# Patient Record
Sex: FEMALE | Race: NATIVE HAWAIIAN OR OTHER PACIFIC ISLANDER | NOT HISPANIC OR LATINO
[De-identification: names, ages, dates, MRNs, and addresses within clinical notes are randomized per-mention and may not be internally consistent; named-entity substitution may affect disease eponyms.]

---

## 2023-06-26 ENCOUNTER — APPOINTMENT (OUTPATIENT)
Dept: ORTHOPEDIC SURGERY | Facility: CLINIC | Age: 38
End: 2023-06-26
Payer: COMMERCIAL

## 2023-06-26 DIAGNOSIS — S82.002A UNSPECIFIED FRACTURE OF LEFT PATELLA, INITIAL ENCOUNTER FOR CLOSED FRACTURE: ICD-10-CM

## 2023-06-26 DIAGNOSIS — M25.562 PAIN IN LEFT KNEE: ICD-10-CM

## 2023-06-26 PROBLEM — Z00.00 ENCOUNTER FOR PREVENTIVE HEALTH EXAMINATION: Status: ACTIVE | Noted: 2023-06-26

## 2023-06-26 PROCEDURE — 73562 X-RAY EXAM OF KNEE 3: CPT | Mod: LT

## 2023-06-26 RX ORDER — IBUPROFEN 600 MG/1
600 TABLET, FILM COATED ORAL TWICE DAILY
Qty: 60 | Refills: 0 | Status: ACTIVE | COMMUNITY
Start: 2023-06-26 | End: 1900-01-01

## 2023-06-26 NOTE — PHYSICAL EXAM
[de-identified] : Patient is WDWN, alert, and in no acute distress. Breathing is unlabored. She is grossly oriented to person, place, and time.\par \par She is accompanied by her father today.\par \par Left Knee:\par She presents to the office with a knee immobilizer and bulky Diallo dressing in place, which were removed for physical exam.\par NWB with the assistance of crutches.\par There is an abrasion noted anterolaterally overlying the patella. \par Moderate edema noted diffusely to the knee with mild ecchymosis. \par She cannot actively flex and extend the knee due to injury and pain, however on exam she can maintain extension of the knee against gravity indicative of her extensor mechanism to be intact.  [de-identified] : AP, lateral and oblique views of the LEFT knee were obtained today and revealed a longitudinal fracture of the patella with displacement. The patellofemoral joint appears to be well aligned.

## 2023-06-26 NOTE — END OF VISIT
[FreeTextEntry3] : All medical record entries made by the Scribe were at my,  Dr. Lukas Wright MD., direction and personally dictated by me on 06/26/2023. I have personally reviewed the chart and agree that the record accurately reflects my personal performance of the history, physical exam, assessment and plan.

## 2023-06-26 NOTE — ADDENDUM
[FreeTextEntry1] : I, Manju Hayes wrote this note acting as a scribe for Dr. Lukas Wright on Jun 26, 2023.

## 2023-06-26 NOTE — DISCUSSION/SUMMARY
[de-identified] : The underlying pathophysiology was reviewed with the patient. XR films were reviewed with the patient. Discussed at length the nature of the patient’s condition. The left knee symptoms are secondary to a longitudinal patella fracture. \par \par At this time, I reviewed her xrays with her and her father in great detail. I explained to them that based upon my review of today's xrays, although there is evidence of mild extra articular displacement, the patello  joint surface is well aligned, and I would recommend nonoperative management. She was instructed to remain immobilized in the knee immobilizer brace for 6 weeks. I did tell her that if she were to fall on a bent knee without the knee immobilizer brace in place, she is at risk for displacing the fracture and subsequently requiring ORIF. She may WBAT with either the use of a cane or walker, again with the knee immobilizer brace in place to maintain extension and for support/protection. \par \par RX: Ibuprofen 600mg, BID (60 tablets).\par RX: Wheelchair.\par \par All questions answered, understanding verbalized. Patient in agreement with plan of care. Follow up in 1 week for repeat xrays.
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
## 2023-07-06 ENCOUNTER — APPOINTMENT (OUTPATIENT)
Dept: ORTHOPEDIC SURGERY | Facility: CLINIC | Age: 38
End: 2023-07-06
Payer: COMMERCIAL

## 2023-07-06 PROCEDURE — 99203 OFFICE O/P NEW LOW 30 MIN: CPT

## 2023-07-06 PROCEDURE — 73564 X-RAY EXAM KNEE 4 OR MORE: CPT | Mod: LT

## 2023-07-06 NOTE — REASON FOR VISIT
[Follow-Up Visit] : a follow-up visit for [Family Member] : family member [FreeTextEntry2] : Left knee

## 2023-07-06 NOTE — PHYSICAL EXAM
Please call patient .  Thanks [de-identified] : Patient is WDWN, alert, and in no acute distress. Breathing is unlabored. She is grossly oriented to person, place, and time.\par \par She is accompanied by her father today.\par \par Left Knee:\par She presents to the office with a knee immobilizer, which was removed for exam.\par WBAT with the assistance of crutches.\par There is a healing abrasion noted anterolaterally overlying the patella. \par Moderate edema noted diffusely to the knee with mild ecchymosis. \par She cannot actively flex and extend the knee due to injury and pain, however on exam she can maintain extension of the knee against gravity indicative of her extensor mechanism to be intact.  [de-identified] : AP, lateral and oblique views of the LEFT knee were obtained today and revealed a longitudinal fracture of the patella with displacement. The patellofemoral joint appears to be well aligned.

## 2023-07-06 NOTE — ADDENDUM
[FreeTextEntry1] : I, Manju Hayes wrote this note acting as a scribe for Dr. Lukas Wright on Jul 06, 2023.

## 2023-07-06 NOTE — END OF VISIT
[FreeTextEntry3] : All medical record entries made by the Scribe were at my,  Dr. Lukas Wright MD., direction and personally dictated by me on 07/06/2023. I have personally reviewed the chart and agree that the record accurately reflects my personal performance of the history, physical exam, assessment and plan.
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
## 2023-07-06 NOTE — DISCUSSION/SUMMARY
[de-identified] : The underlying pathophysiology was reviewed with the patient. XR films were reviewed with the patient. Discussed at length the nature of the patient’s condition. The left knee symptoms are secondary to a longitudinal patella fracture. \par \par At this time, I reviewed today's xrays with her in great detail. I explained to them that based upon my review of today's xrays, although there is evidence of mild extra articular displacement, the patellar joint surface is well aligned and the alignment of the fracture has remained as it was on prior xrays. I would therefore recommend continued nonoperative management. She was instructed to remain immobilized in the knee immobilizer brace for a total of 6 weeks. I did tell her that if she were to fall on a bent knee without the knee immobilizer brace in place, she is at risk for displacing the fracture and subsequently requiring ORIF. She may WBAT with either the use of a cane or walker, again with the knee immobilizer brace in place to maintain extension and for support/protection. She was however advised to remove the brace when seated and begin flexion and extension exercises of the knee as she has developed a great deal of stiffness due to a lack of motion. \par \par All questions answered, understanding verbalized. Patient in agreement with plan of care. Follow up in 2 weeks for repeat xrays.

## 2023-07-20 ENCOUNTER — TRANSCRIPTION ENCOUNTER (OUTPATIENT)
Age: 38
End: 2023-07-20

## 2023-07-20 ENCOUNTER — APPOINTMENT (OUTPATIENT)
Dept: ORTHOPEDIC SURGERY | Facility: CLINIC | Age: 38
End: 2023-07-20
Payer: COMMERCIAL

## 2023-07-20 VITALS — HEIGHT: 63 IN | BODY MASS INDEX: 22.15 KG/M2 | WEIGHT: 125 LBS

## 2023-07-20 PROCEDURE — 73562 X-RAY EXAM OF KNEE 3: CPT | Mod: LT

## 2023-07-20 PROCEDURE — 99213 OFFICE O/P EST LOW 20 MIN: CPT
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
## 2023-07-20 NOTE — END OF VISIT
[FreeTextEntry3] : All medical record entries made by the Scribe were at my,  Dr. Lukas Wright MD., direction and personally dictated by me on 07/20/2023. I have personally reviewed the chart and agree that the record accurately reflects my personal performance of the history, physical exam, assessment and plan.

## 2023-07-20 NOTE — ADDENDUM
[FreeTextEntry1] : I, Manju Hayes wrote this note acting as a scribe for Dr. Lukas Wright on Jul 20, 2023.

## 2023-07-20 NOTE — DISCUSSION/SUMMARY
[de-identified] : The underlying pathophysiology was reviewed with the patient. XR films were reviewed with the patient. Discussed at length the nature of the patient’s condition. The left knee symptoms are secondary to a longitudinal patella fracture. \par \par At this time, I reviewed today's xrays with her in great detail. I explained to them that based upon my review of today's xrays, although there is evidence of mild extra articular displacement, the patellar joint surface is well aligned and the alignment of the fracture has remained as it was on prior xrays. I would therefore recommend continued nonoperative management. She was instructed to remain immobilized in the knee immobilizer brace for a total of 6 weeks. I did tell her that if she were to fall on a bent knee without the knee immobilizer brace in place, she is at risk for displacing the fracture and subsequently requiring ORIF. She may WBAT with either the use of a cane or walker, again with the knee immobilizer brace in place to maintain extension and for support/protection. She was instructed on continued flexion and extension exercises of the knee as well as leg lift exercises for quadriceps strengthening. \par \par All questions answered, understanding verbalized. Patient in agreement with plan of care. Follow up in 2 weeks for repeat xrays.

## 2023-07-20 NOTE — PHYSICAL EXAM
[de-identified] : Patient is WDWN, alert, and in no acute distress. Breathing is unlabored. She is grossly oriented to person, place, and time.\par \par Left Knee:\par She presents to the office with a knee immobilizer, which was removed for exam. WBAT.\par There is a healing abrasion noted anterolaterally overlying the patella. \par Improved edema noted diffusely to the knee with ecchymosis noted distally to the left patella. \par She is able to actively straight leg raise. \par ROM: 0°-115°   [de-identified] : AP, lateral and oblique views of the LEFT knee were obtained today and revealed a longitudinal fracture of the patella with displacement. The patellofemoral joint appears to be well aligned.

## 2023-08-03 ENCOUNTER — APPOINTMENT (OUTPATIENT)
Dept: ORTHOPEDIC SURGERY | Facility: CLINIC | Age: 38
End: 2023-08-03
Payer: COMMERCIAL

## 2023-08-03 VITALS — BODY MASS INDEX: 22.15 KG/M2 | HEIGHT: 63 IN | WEIGHT: 125 LBS

## 2023-08-03 PROCEDURE — 99213 OFFICE O/P EST LOW 20 MIN: CPT

## 2023-08-03 PROCEDURE — 73562 X-RAY EXAM OF KNEE 3: CPT | Mod: LT

## 2023-08-03 NOTE — PHYSICAL EXAM
[de-identified] : Patient is WDWN, alert, and in no acute distress. Breathing is unlabored. She is grossly oriented to person, place, and time.  She is accompanied by her father today.  Left Knee: She presents to the office with a knee immobilizer, which was removed for exam. There is a healing abrasion noted anterolaterally overlying the patella.  Moderate edema noted diffusely to the knee with mild ecchymosis.  ROM: 0-130 [de-identified] : AP, lateral and oblique views of the LEFT knee were obtained today and revealed a longitudinal fracture of the patella with displacement. The patellofemoral joint appears to be well aligned. The fracture is healing well, with the fracture line still evident.

## 2023-08-03 NOTE — RETURN TO WORK/SCHOOL
[FreeTextEntry1] : Ms. AVE AVELAR was seen in the office today on 08/03/2023 and evaluated by me for an Orthopedic visit. Please be advised that she will remain out of work for another 4 weeks and thereafter will return.  [FreeTextEntry2] : Dr. Lukas Wright M.D. on 08/03/2023.

## 2023-08-03 NOTE — DISCUSSION/SUMMARY
[de-identified] : The underlying pathophysiology was reviewed with the patient. XR films were reviewed with the patient. Discussed at length the nature of the patient's condition. The left knee symptoms are secondary to a longitudinal patella fracture.   At this time, she may begin weaning off of the knee immobilizer brace, notably when in her home. If she feels her knee is weak and as if it will buckle, she should continue using the knee immobilizer brace. I advised her to utilize a cane or crutch when ambulating. She was instructed on continued ROM exercises of the knee. I recommended low impact activities such as walking, elliptical and stationary bike. She will begin physical therapy. The patient wishes to proceed with physical therapy of the left knee (WBAT, ROM and strengthening). A script was given.  Finally, with regard to work, she will remain out of work for another 4 weeks and was provided with a note stating as such.   All questions answered, understanding verbalized. Patient in agreement with plan of care. Follow up in 4 weeks for repeat xrays.

## 2023-08-03 NOTE — ADDENDUM
[FreeTextEntry1] : I, Manju Hayes wrote this note acting as a scribe for Dr. Lukas Wright on Aug 03, 2023.

## 2023-08-03 NOTE — END OF VISIT
[FreeTextEntry3] : All medical record entries made by the Scribe were at my,  Dr. Lukas Wright MD., direction and personally dictated by me on 08/03/2023. I have personally reviewed the chart and agree that the record accurately reflects my personal performance of the history, physical exam, assessment and plan.
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
## 2023-08-31 ENCOUNTER — APPOINTMENT (OUTPATIENT)
Dept: ORTHOPEDIC SURGERY | Facility: CLINIC | Age: 38
End: 2023-08-31

## 2023-10-09 ENCOUNTER — APPOINTMENT (OUTPATIENT)
Dept: ORTHOPEDIC SURGERY | Facility: CLINIC | Age: 38
End: 2023-10-09
Payer: COMMERCIAL

## 2023-10-09 VITALS — WEIGHT: 128 LBS | BODY MASS INDEX: 23.55 KG/M2 | HEIGHT: 62 IN

## 2023-10-09 DIAGNOSIS — S82.002A UNSPECIFIED FRACTURE OF LEFT PATELLA, INITIAL ENCOUNTER FOR CLOSED FRACTURE: ICD-10-CM

## 2023-10-09 PROCEDURE — 73562 X-RAY EXAM OF KNEE 3: CPT | Mod: LT

## 2023-10-09 PROCEDURE — 99213 OFFICE O/P EST LOW 20 MIN: CPT
